# Patient Record
Sex: FEMALE | Race: WHITE | NOT HISPANIC OR LATINO | URBAN - METROPOLITAN AREA
[De-identification: names, ages, dates, MRNs, and addresses within clinical notes are randomized per-mention and may not be internally consistent; named-entity substitution may affect disease eponyms.]

---

## 2017-12-11 ENCOUNTER — INPATIENT (INPATIENT)
Facility: HOSPITAL | Age: 67
LOS: 2 days | Discharge: HOME | End: 2017-12-14
Attending: INTERNAL MEDICINE | Admitting: INTERNAL MEDICINE
Payer: MEDICARE

## 2017-12-11 VITALS
SYSTOLIC BLOOD PRESSURE: 73 MMHG | HEIGHT: 61 IN | RESPIRATION RATE: 16 BRPM | DIASTOLIC BLOOD PRESSURE: 46 MMHG | TEMPERATURE: 98 F | OXYGEN SATURATION: 100 % | HEART RATE: 96 BPM | WEIGHT: 130.07 LBS

## 2017-12-11 DIAGNOSIS — Z98.49 CATARACT EXTRACTION STATUS, UNSPECIFIED EYE: Chronic | ICD-10-CM

## 2017-12-11 LAB
ALBUMIN SERPL ELPH-MCNC: 3.2 G/DL — LOW (ref 3.3–5)
ALP SERPL-CCNC: 94 U/L — SIGNIFICANT CHANGE UP (ref 40–120)
ALT FLD-CCNC: 27 U/L — SIGNIFICANT CHANGE UP (ref 12–78)
ANION GAP SERPL CALC-SCNC: 11 MMOL/L — SIGNIFICANT CHANGE UP (ref 5–17)
APPEARANCE UR: (no result)
APTT BLD: 26.5 SEC — LOW (ref 27.5–37.4)
AST SERPL-CCNC: 18 U/L — SIGNIFICANT CHANGE UP (ref 15–37)
BACTERIA # UR AUTO: (no result)
BASOPHILS # BLD AUTO: 0 K/UL — SIGNIFICANT CHANGE UP (ref 0–0.2)
BASOPHILS NFR BLD AUTO: 0.3 % — SIGNIFICANT CHANGE UP (ref 0–2)
BILIRUB SERPL-MCNC: 0.5 MG/DL — SIGNIFICANT CHANGE UP (ref 0.2–1.2)
BILIRUB UR-MCNC: NEGATIVE — SIGNIFICANT CHANGE UP
BUN SERPL-MCNC: 15 MG/DL — SIGNIFICANT CHANGE UP (ref 7–23)
CALCIUM SERPL-MCNC: 8.7 MG/DL — SIGNIFICANT CHANGE UP (ref 8.5–10.1)
CHLORIDE SERPL-SCNC: 90 MMOL/L — LOW (ref 96–108)
CO2 SERPL-SCNC: 25 MMOL/L — SIGNIFICANT CHANGE UP (ref 22–31)
COLOR SPEC: YELLOW — SIGNIFICANT CHANGE UP
CREAT SERPL-MCNC: 1.57 MG/DL — HIGH (ref 0.5–1.3)
DIFF PNL FLD: (no result)
EOSINOPHIL # BLD AUTO: 0 K/UL — SIGNIFICANT CHANGE UP (ref 0–0.5)
EOSINOPHIL NFR BLD AUTO: 0.3 % — SIGNIFICANT CHANGE UP (ref 0–6)
EPI CELLS # UR: (no result)
FLUBV RNA SPEC QL NAA+PROBE: DETECTED
GLUCOSE SERPL-MCNC: 492 MG/DL — CRITICAL HIGH (ref 70–99)
GLUCOSE UR QL: 1000 MG/DL
HCT VFR BLD CALC: 40.3 % — SIGNIFICANT CHANGE UP (ref 34.5–45)
HGB BLD-MCNC: 13.2 G/DL — SIGNIFICANT CHANGE UP (ref 11.5–15.5)
INR BLD: 1.16 RATIO — SIGNIFICANT CHANGE UP (ref 0.88–1.16)
KETONES UR-MCNC: (no result)
LACTATE SERPL-SCNC: 1.3 MMOL/L — SIGNIFICANT CHANGE UP (ref 0.7–2)
LACTATE SERPL-SCNC: 3.1 MMOL/L — HIGH (ref 0.7–2)
LEUKOCYTE ESTERASE UR-ACNC: (no result)
LYMPHOCYTES # BLD AUTO: 1.1 K/UL — SIGNIFICANT CHANGE UP (ref 1–3.3)
LYMPHOCYTES # BLD AUTO: 9.8 % — LOW (ref 13–44)
MCHC RBC-ENTMCNC: 29.2 PG — SIGNIFICANT CHANGE UP (ref 27–34)
MCHC RBC-ENTMCNC: 32.8 GM/DL — SIGNIFICANT CHANGE UP (ref 32–36)
MCV RBC AUTO: 88.9 FL — SIGNIFICANT CHANGE UP (ref 80–100)
MONOCYTES # BLD AUTO: 0.6 K/UL — SIGNIFICANT CHANGE UP (ref 0–0.9)
MONOCYTES NFR BLD AUTO: 5.4 % — SIGNIFICANT CHANGE UP (ref 2–14)
NEUTROPHILS # BLD AUTO: 9.8 K/UL — HIGH (ref 1.8–7.4)
NEUTROPHILS NFR BLD AUTO: 84.2 % — HIGH (ref 43–77)
NITRITE UR-MCNC: NEGATIVE — SIGNIFICANT CHANGE UP
PH UR: 5 — SIGNIFICANT CHANGE UP (ref 5–8)
PLATELET # BLD AUTO: 198 K/UL — SIGNIFICANT CHANGE UP (ref 150–400)
POTASSIUM SERPL-MCNC: 4.3 MMOL/L — SIGNIFICANT CHANGE UP (ref 3.5–5.3)
POTASSIUM SERPL-SCNC: 4.3 MMOL/L — SIGNIFICANT CHANGE UP (ref 3.5–5.3)
PROT SERPL-MCNC: 7.7 GM/DL — SIGNIFICANT CHANGE UP (ref 6–8.3)
PROT UR-MCNC: 15 MG/DL
PROTHROM AB SERPL-ACNC: 12.6 SEC — SIGNIFICANT CHANGE UP (ref 9.8–12.7)
RAPID RVP RESULT: DETECTED
RBC # BLD: 4.53 M/UL — SIGNIFICANT CHANGE UP (ref 3.8–5.2)
RBC # FLD: 11.6 % — SIGNIFICANT CHANGE UP (ref 10.3–14.5)
RBC CASTS # UR COMP ASSIST: (no result) /HPF (ref 0–4)
SODIUM SERPL-SCNC: 126 MMOL/L — LOW (ref 135–145)
SP GR SPEC: 1 — LOW (ref 1.01–1.02)
UROBILINOGEN FLD QL: NEGATIVE MG/DL — SIGNIFICANT CHANGE UP
WBC # BLD: 11.7 K/UL — HIGH (ref 3.8–10.5)
WBC # FLD AUTO: 11.7 K/UL — HIGH (ref 3.8–10.5)
WBC UR QL: (no result)

## 2017-12-11 PROCEDURE — 93010 ELECTROCARDIOGRAM REPORT: CPT

## 2017-12-11 PROCEDURE — 71010: CPT | Mod: 26

## 2017-12-11 PROCEDURE — 99285 EMERGENCY DEPT VISIT HI MDM: CPT

## 2017-12-11 PROCEDURE — 71260 CT THORAX DX C+: CPT | Mod: 26

## 2017-12-11 RX ORDER — SODIUM CHLORIDE 9 MG/ML
2000 INJECTION INTRAMUSCULAR; INTRAVENOUS; SUBCUTANEOUS ONCE
Qty: 0 | Refills: 0 | Status: COMPLETED | OUTPATIENT
Start: 2017-12-11 | End: 2017-12-11

## 2017-12-11 RX ORDER — CEFTRIAXONE 500 MG/1
1 INJECTION, POWDER, FOR SOLUTION INTRAMUSCULAR; INTRAVENOUS ONCE
Qty: 0 | Refills: 0 | Status: DISCONTINUED | OUTPATIENT
Start: 2017-12-11 | End: 2017-12-11

## 2017-12-11 RX ORDER — AZITHROMYCIN 500 MG/1
500 TABLET, FILM COATED ORAL ONCE
Qty: 0 | Refills: 0 | Status: COMPLETED | OUTPATIENT
Start: 2017-12-11 | End: 2017-12-11

## 2017-12-11 RX ORDER — CEFTRIAXONE 500 MG/1
1 INJECTION, POWDER, FOR SOLUTION INTRAMUSCULAR; INTRAVENOUS EVERY 24 HOURS
Qty: 0 | Refills: 0 | Status: DISCONTINUED | OUTPATIENT
Start: 2017-12-11 | End: 2017-12-11

## 2017-12-11 RX ORDER — SODIUM CHLORIDE 9 MG/ML
1000 INJECTION INTRAMUSCULAR; INTRAVENOUS; SUBCUTANEOUS ONCE
Qty: 0 | Refills: 0 | Status: COMPLETED | OUTPATIENT
Start: 2017-12-11 | End: 2017-12-11

## 2017-12-11 RX ORDER — SODIUM CHLORIDE 9 MG/ML
3 INJECTION INTRAMUSCULAR; INTRAVENOUS; SUBCUTANEOUS ONCE
Qty: 0 | Refills: 0 | Status: COMPLETED | OUTPATIENT
Start: 2017-12-11 | End: 2017-12-11

## 2017-12-11 RX ORDER — CEFTRIAXONE 500 MG/1
1000 INJECTION, POWDER, FOR SOLUTION INTRAMUSCULAR; INTRAVENOUS EVERY 24 HOURS
Qty: 0 | Refills: 0 | Status: DISCONTINUED | OUTPATIENT
Start: 2017-12-11 | End: 2017-12-14

## 2017-12-11 RX ORDER — AZITHROMYCIN 500 MG/1
500 TABLET, FILM COATED ORAL EVERY 24 HOURS
Qty: 0 | Refills: 0 | Status: DISCONTINUED | OUTPATIENT
Start: 2017-12-11 | End: 2017-12-14

## 2017-12-11 RX ORDER — CEFTRIAXONE 500 MG/1
1000 INJECTION, POWDER, FOR SOLUTION INTRAMUSCULAR; INTRAVENOUS ONCE
Qty: 0 | Refills: 0 | Status: COMPLETED | OUTPATIENT
Start: 2017-12-11 | End: 2017-12-11

## 2017-12-11 RX ADMIN — Medication 75 MILLIGRAM(S): at 22:34

## 2017-12-11 RX ADMIN — AZITHROMYCIN 255 MILLIGRAM(S): 500 TABLET, FILM COATED ORAL at 21:02

## 2017-12-11 RX ADMIN — SODIUM CHLORIDE 1000 MILLILITER(S): 9 INJECTION INTRAMUSCULAR; INTRAVENOUS; SUBCUTANEOUS at 17:24

## 2017-12-11 RX ADMIN — SODIUM CHLORIDE 3 MILLILITER(S): 9 INJECTION INTRAMUSCULAR; INTRAVENOUS; SUBCUTANEOUS at 16:15

## 2017-12-11 RX ADMIN — SODIUM CHLORIDE 1000 MILLILITER(S): 9 INJECTION INTRAMUSCULAR; INTRAVENOUS; SUBCUTANEOUS at 19:55

## 2017-12-11 RX ADMIN — CEFTRIAXONE 1000 MILLIGRAM(S): 500 INJECTION, POWDER, FOR SOLUTION INTRAMUSCULAR; INTRAVENOUS at 21:02

## 2017-12-11 RX ADMIN — SODIUM CHLORIDE 2000 MILLILITER(S): 9 INJECTION INTRAMUSCULAR; INTRAVENOUS; SUBCUTANEOUS at 16:15

## 2017-12-11 NOTE — ED PROVIDER NOTE - OBJECTIVE STATEMENT
68 y/o female with a PMHx of HTN, HLD, DM presents to the ED c/o hypotension for over a week. Pt c/o cough, sore throat, body aches, diarrhea and vomit x4 days. Denies difficulty urinating, fever. Non smoker. Daughter has been diagnosed with the flu.

## 2017-12-11 NOTE — H&P ADULT - NSHPPHYSICALEXAM_GEN_ALL_CORE
ICU Vital Signs Last 24 Hrs    T(F): 98.2 (11 Dec 2017 21:50), Max: 98.2 (11 Dec 2017 21:50)    HR: 98 (11 Dec 2017 21:50) (79 - 98)    BP: 104/61 (11 Dec 2017 21:50) (73/46 - 104/61)    RR: 20    SpO2: 98%

## 2017-12-11 NOTE — H&P ADULT - ASSESSMENT
Pt is admitted w/    I. Pneumonia, with Septic Shock,  Hypotension.  RVP positive Influ .  Lactate 3.1, improved to 1.3.  - IVF   - Rocephin , Zithromax  - Tamiflu  - blood cultures, sputum cx    II. Acute Renal Injury,  likely prerenal   - IVF   - follow labs    III. DM II  - Insulin sliding carmen    IV. DVT prophylaxis  - heparin Pt is a  66 y/o woman  with a PMHx of HTN, HLD, DM II who presents to the ER c/o weakness,  cough and chills.  She was seen at her PMD's office on Dec 5th and treated for viral gastroenteritis, at the time her rapid flu test was negative/indeterminate.  She has worsened and reports increased lethargy and not being able to eat for 5 days.  She said her daughter and other family members at home have been having the flu.        She denies chest pain, abd pain, fever.  An outpatient eval showed hypotension and  xray showed ovoid opacities bilaterally concerning for Pna.     CT shows:  Small patchy airspace consolidation in the right upper lobe, left upper   lobe, and lingula indicative of pneumonia.       Pt is admitted w/    I. Pneumonia, with Septic Shock,  Hypotension.  RVP positive : Influ B .  Lactate 3.1, improved to 1.3.  - IVF , 3 Liters given  - Rocephin , Zithromax  - Tamiflu  - blood cultures, sputum cx  - ID consult    II. Hyponatremia and Acute Renal Injury,  likely prerenal  due to dehydration, pt denies renal issues in the past  - IVF   - hold lisinpril  and oral DM agents tonight  - follow labs    III. DM II  - Insulin sliding carmen    IV. Follow-up noncontrast chest CT   - in one month to ensure complete resolution of lung findings       V. DVT prophylaxis  - heparin

## 2017-12-11 NOTE — H&P ADULT - NSHPLABSRESULTS_GEN_ALL_CORE
NSR 96 bpm, LAD        < from: CT Chest w/ IV Cont (12.11.17 @ 18:03) >    EXAM:  CT CHEST IC                          PROCEDURE DATE:  12/11/2017      INTERPRETATION:  CT CHEST IC    HISTORY:  cough 1 week hypotension  r/o pna                     TECHNIQUE: Contrast enhanced CT of the chest was performed with 90 cc  Omnipaque 350 IV contrast. Coronal and sagittal images were   reconstructed. This study was performed using automatic exposure control   (radiation dose reduction software) to obtain a diagnostic image quality   scan with patient dose as low as reasonably achievable.    COMPARISON: Chest x-ray from the same day    FINDINGS:    LUNGS, AIRWAYS: No central endobronchial lesion or obstruction. Small   patchy airspace consolidation in the anterior right upper lobe, left   upper lobe, and lingula.    PLEURA: No pleural abnormality.    HEART AND VESSELS: Normal heart size. No pericardial effusion. Normal   caliber thoracic aorta. A ductus bump is noted. Main pulmonary arteries   are patent.    MEDIASTINUM AND GURWINDER: No adenopathy.    UPPER ABDOMEN:Diffuse hepatic steatosis.    BONES AND SOFT TISSUES: No acute bony abnormality.    IMPRESSION:     Small patchy airspace consolidation in the right upper lobe, left upper   lobe, and lingula indicative of pneumonia. Follow-up noncontrast chest CT   in one month to ensure complete resolution.    VERONICA GARCIA   This document has been electronically signed. Dec 11 2017  6:40PM    < end of copied text >

## 2017-12-11 NOTE — H&P ADULT - HISTORY OF PRESENT ILLNESS
Pt is a  68 y/o  with a PMHx of HTN, HLD, DM II presents to the ED c/o hypotension for over a week.   Pt c/o cough, sore throat, body aches, diarrhea and vomit x4 days. Denies difficulty urinating, fever. Non smoker. Daughter has been diagnosed with the flu. Pt is a  66 y/o woman  with a PMHx of HTN, HLD, DM II who presents to the ER c/o weakness,  cough and chills.  She was seen at her PMD's office on Dec 5th and treated for viral gastroenteritis, at the time her rapid flu test was negative/indeterminate.  She has worsened and reports increased lethargy and not being able to eat for 5 days.  She said her daughter and other family members at home have been having the flu.        She denies chest pain, abd pain, fever.  An outpatient xray showed ovoid opacities bilaterally concerning for Pna.     RVP is positive in the ER and pt is hypotensive

## 2017-12-11 NOTE — ED PROVIDER NOTE - NS_ ATTENDINGSCRIBEDETAILS _ED_A_ED_FT
I, Cesar Oliver MD,  performed the initial face to face bedside interview with this patient regarding history of present illness, review of symptoms and relevant past medical, social and family history.  I completed an independent physical examination.    The history, relevant review of systems, past medical and surgical history, medical decision making, and physical examination was documented by the scribe in my presence and I attest to the accuracy of the documentation.

## 2017-12-11 NOTE — ED ADULT NURSE NOTE - OBJECTIVE STATEMENT
patient sent by pcp for hypotensive episode, patient 106/83 on arrival into trauma room. patient is awake, alert and oriented states she had nausea that resolved 5 days ago, had diarrhea 2 days ago, but took immodium with no event since.  patient states she has been fatigued with myalgias, states her daughter in law is recovering from the flu.

## 2017-12-11 NOTE — ED PROVIDER NOTE - PROGRESS NOTE DETAILS
Becki SP: Dr. Oliver spoke with radiology and decided benefits of IV contrast to r/o emboli out way the risks. NIELS Lynne have attested this document for and under the supervision of Dr. Oliver

## 2017-12-11 NOTE — ED ADULT TRIAGE NOTE - CHIEF COMPLAINT QUOTE
Patient comes to ED for cough for 2 weeks. Pt had chest xray done showed opacities and possible metatisis. pt also hypotensive

## 2017-12-12 LAB
ANION GAP SERPL CALC-SCNC: 6 MMOL/L — SIGNIFICANT CHANGE UP (ref 5–17)
BASOPHILS # BLD AUTO: 0 K/UL — SIGNIFICANT CHANGE UP (ref 0–0.2)
BASOPHILS NFR BLD AUTO: 0.4 % — SIGNIFICANT CHANGE UP (ref 0–2)
BUN SERPL-MCNC: 11 MG/DL — SIGNIFICANT CHANGE UP (ref 7–23)
CALCIUM SERPL-MCNC: 7.6 MG/DL — LOW (ref 8.5–10.1)
CHLORIDE SERPL-SCNC: 105 MMOL/L — SIGNIFICANT CHANGE UP (ref 96–108)
CO2 SERPL-SCNC: 24 MMOL/L — SIGNIFICANT CHANGE UP (ref 22–31)
CREAT SERPL-MCNC: 0.84 MG/DL — SIGNIFICANT CHANGE UP (ref 0.5–1.3)
EOSINOPHIL # BLD AUTO: 0.1 K/UL — SIGNIFICANT CHANGE UP (ref 0–0.5)
EOSINOPHIL NFR BLD AUTO: 1.1 % — SIGNIFICANT CHANGE UP (ref 0–6)
GLUCOSE BLDC GLUCOMTR-MCNC: 256 MG/DL — HIGH (ref 70–99)
GLUCOSE BLDC GLUCOMTR-MCNC: 278 MG/DL — HIGH (ref 70–99)
GLUCOSE BLDC GLUCOMTR-MCNC: 343 MG/DL — HIGH (ref 70–99)
GLUCOSE BLDC GLUCOMTR-MCNC: 346 MG/DL — HIGH (ref 70–99)
GLUCOSE SERPL-MCNC: 334 MG/DL — HIGH (ref 70–99)
HCT VFR BLD CALC: 31 % — LOW (ref 34.5–45)
HGB BLD-MCNC: 10.5 G/DL — LOW (ref 11.5–15.5)
LYMPHOCYTES # BLD AUTO: 1.2 K/UL — SIGNIFICANT CHANGE UP (ref 1–3.3)
LYMPHOCYTES # BLD AUTO: 14.9 % — SIGNIFICANT CHANGE UP (ref 13–44)
MCHC RBC-ENTMCNC: 29.8 PG — SIGNIFICANT CHANGE UP (ref 27–34)
MCHC RBC-ENTMCNC: 33.8 GM/DL — SIGNIFICANT CHANGE UP (ref 32–36)
MCV RBC AUTO: 88 FL — SIGNIFICANT CHANGE UP (ref 80–100)
MONOCYTES # BLD AUTO: 0.6 K/UL — SIGNIFICANT CHANGE UP (ref 0–0.9)
MONOCYTES NFR BLD AUTO: 8 % — SIGNIFICANT CHANGE UP (ref 2–14)
NEUTROPHILS # BLD AUTO: 6.1 K/UL — SIGNIFICANT CHANGE UP (ref 1.8–7.4)
NEUTROPHILS NFR BLD AUTO: 75.7 % — SIGNIFICANT CHANGE UP (ref 43–77)
PLATELET # BLD AUTO: 154 K/UL — SIGNIFICANT CHANGE UP (ref 150–400)
POTASSIUM SERPL-MCNC: 3.7 MMOL/L — SIGNIFICANT CHANGE UP (ref 3.5–5.3)
POTASSIUM SERPL-SCNC: 3.7 MMOL/L — SIGNIFICANT CHANGE UP (ref 3.5–5.3)
RBC # BLD: 3.52 M/UL — LOW (ref 3.8–5.2)
RBC # FLD: 11.6 % — SIGNIFICANT CHANGE UP (ref 10.3–14.5)
SODIUM SERPL-SCNC: 135 MMOL/L — SIGNIFICANT CHANGE UP (ref 135–145)
WBC # BLD: 8 K/UL — SIGNIFICANT CHANGE UP (ref 3.8–10.5)
WBC # FLD AUTO: 8 K/UL — SIGNIFICANT CHANGE UP (ref 3.8–10.5)

## 2017-12-12 RX ORDER — HEPARIN SODIUM 5000 [USP'U]/ML
5000 INJECTION INTRAVENOUS; SUBCUTANEOUS EVERY 12 HOURS
Qty: 0 | Refills: 0 | Status: DISCONTINUED | OUTPATIENT
Start: 2017-12-12 | End: 2017-12-14

## 2017-12-12 RX ORDER — ALBUTEROL 90 UG/1
2.5 AEROSOL, METERED ORAL THREE TIMES A DAY
Qty: 0 | Refills: 0 | Status: DISCONTINUED | OUTPATIENT
Start: 2017-12-12 | End: 2017-12-12

## 2017-12-12 RX ORDER — INSULIN LISPRO 100/ML
VIAL (ML) SUBCUTANEOUS AT BEDTIME
Qty: 0 | Refills: 0 | Status: DISCONTINUED | OUTPATIENT
Start: 2017-12-12 | End: 2017-12-14

## 2017-12-12 RX ORDER — LISINOPRIL 2.5 MG/1
10 TABLET ORAL DAILY
Qty: 0 | Refills: 0 | Status: DISCONTINUED | OUTPATIENT
Start: 2017-12-12 | End: 2017-12-12

## 2017-12-12 RX ORDER — DAPAGLIFLOZIN 10 MG/1
10 TABLET, FILM COATED ORAL
Qty: 0 | Refills: 0 | COMMUNITY

## 2017-12-12 RX ORDER — SIMVASTATIN 20 MG/1
20 TABLET, FILM COATED ORAL
Qty: 0 | Refills: 0 | COMMUNITY

## 2017-12-12 RX ORDER — ALBUTEROL 90 UG/1
2.5 AEROSOL, METERED ORAL THREE TIMES A DAY
Qty: 0 | Refills: 0 | Status: DISCONTINUED | OUTPATIENT
Start: 2017-12-12 | End: 2017-12-14

## 2017-12-12 RX ORDER — ACETAMINOPHEN 500 MG
500 TABLET ORAL EVERY 6 HOURS
Qty: 0 | Refills: 0 | Status: DISCONTINUED | OUTPATIENT
Start: 2017-12-12 | End: 2017-12-14

## 2017-12-12 RX ORDER — INSULIN LISPRO 100/ML
VIAL (ML) SUBCUTANEOUS
Qty: 0 | Refills: 0 | Status: DISCONTINUED | OUTPATIENT
Start: 2017-12-12 | End: 2017-12-14

## 2017-12-12 RX ORDER — SODIUM CHLORIDE 9 MG/ML
500 INJECTION INTRAMUSCULAR; INTRAVENOUS; SUBCUTANEOUS
Qty: 0 | Refills: 0 | Status: DISCONTINUED | OUTPATIENT
Start: 2017-12-12 | End: 2017-12-13

## 2017-12-12 RX ORDER — BRIMONIDINE TARTRATE, TIMOLOL MALEATE 2; 5 MG/ML; MG/ML
1 SOLUTION/ DROPS OPHTHALMIC
Qty: 0 | Refills: 0 | COMMUNITY

## 2017-12-12 RX ORDER — SIMVASTATIN 20 MG/1
20 TABLET, FILM COATED ORAL AT BEDTIME
Qty: 0 | Refills: 0 | Status: DISCONTINUED | OUTPATIENT
Start: 2017-12-12 | End: 2017-12-14

## 2017-12-12 RX ORDER — METFORMIN HYDROCHLORIDE 850 MG/1
1000 TABLET ORAL
Qty: 0 | Refills: 0 | COMMUNITY

## 2017-12-12 RX ORDER — LISINOPRIL 2.5 MG/1
1 TABLET ORAL
Qty: 0 | Refills: 0 | COMMUNITY

## 2017-12-12 RX ADMIN — SIMVASTATIN 20 MILLIGRAM(S): 20 TABLET, FILM COATED ORAL at 22:42

## 2017-12-12 RX ADMIN — HEPARIN SODIUM 5000 UNIT(S): 5000 INJECTION INTRAVENOUS; SUBCUTANEOUS at 06:42

## 2017-12-12 RX ADMIN — Medication 75 MILLIGRAM(S): at 17:56

## 2017-12-12 RX ADMIN — Medication 100 MILLIGRAM(S): at 22:42

## 2017-12-12 RX ADMIN — Medication 4: at 13:01

## 2017-12-12 RX ADMIN — CEFTRIAXONE 1000 MILLIGRAM(S): 500 INJECTION, POWDER, FOR SOLUTION INTRAMUSCULAR; INTRAVENOUS at 14:40

## 2017-12-12 RX ADMIN — AZITHROMYCIN 255 MILLIGRAM(S): 500 TABLET, FILM COATED ORAL at 14:38

## 2017-12-12 RX ADMIN — SODIUM CHLORIDE 65 MILLILITER(S): 9 INJECTION INTRAMUSCULAR; INTRAVENOUS; SUBCUTANEOUS at 06:41

## 2017-12-12 RX ADMIN — Medication 4: at 17:54

## 2017-12-12 RX ADMIN — HEPARIN SODIUM 5000 UNIT(S): 5000 INJECTION INTRAVENOUS; SUBCUTANEOUS at 17:55

## 2017-12-12 RX ADMIN — Medication 2: at 22:42

## 2017-12-12 RX ADMIN — Medication 3: at 09:13

## 2017-12-12 RX ADMIN — Medication 100 MILLIGRAM(S): at 00:58

## 2017-12-12 RX ADMIN — Medication 100 MILLIGRAM(S): at 16:02

## 2017-12-12 NOTE — ED ADULT NURSE REASSESSMENT NOTE - NS ED NURSE REASSESS COMMENT FT1
Patient A&Ox4, resting comfortably in bed. VSS, denies pain/discomfort. Denies SOB, productive cough with green sputum per patient. No s/s of respiratory distress present, scattered rhonchi to auscultation. Safety & comfort measures in place, will continue to monitor.
care of pt received from Africa Harrison RN, pt and family updated by RN and MD Oliver in regards to radiology results and plan of care at this time. pt and family verbalized understanding of information and need for admission. Bed status explained to pt and family. Pt and family verbalized understanding of information. Pt has no complaints of chest pain, nausea, dizziness or headache. Pt remains on isolation precautions. Care of pt transferred to Aquilino Barbosa RN.
patient had hypotensive episode, states she felt dizzy. IVF infusing. dr tate in to evaluate.  will monitor
patient transported to CT scan, stable at this time. awake, alert and oriented
Patient received from AMANDA Allen. Patient resting comfortably in bed. Safety and comfort maintained. Will continue to monitor.

## 2017-12-12 NOTE — CONSULT NOTE ADULT - ASSESSMENT
Pt is a  66 y/o woman  with a PMHx of HTN, HLD, DM II admitted 12/11 with c/o weakness, cough and chills.  She was seen at her PMD's office on Dec 5th and treated for viral gastroenteritis, at the time her rapid flu test was negative/indeterminate. She has worsened and reports increased lethargy and not being able to eat for 5 days.  She said her daughter and other family members at home have been having the flu. She denies chest pain, abd pain, fever.  An outpatient xray showed ovoid opacities bilaterally concerning for Pna. RVP is positive in the ER for influenza B, CT chest with multifocal patchy consolidations, pt noted to have wbc ct 11.7, noted to have low bp, was given tamiflu, rocephin/azithromycin.    1. sepsis/influenza B/viral syndrome/CAP  - on tamiflu increase dose to 75mg BID, renal function normalized  - on droplet precautions  - continue with rocephin 1gm daily + azithromycin for 500mg daily for superimposed cap coverage  - plan for 3 days of azithromycin  - diarrhea resolved likely viral in etiology  - if diarrhea returns, can send GI PCR  - f/u cbc  - monitor temps  -tolerating abx well so far; no side effects noted  -reason for abx use and side effects reviewed with patient    2. other issues -care per medicine

## 2017-12-12 NOTE — ED ADULT NURSE REASSESSMENT NOTE - COMFORT CARE
assisted to bedpan
plan of care explained
assisted to bedpan
plan of care explained/darkened lights/wait time explained/side rails up

## 2017-12-12 NOTE — CONSULT NOTE ADULT - SUBJECTIVE AND OBJECTIVE BOX
Patient is a 67y old  Female who presents with a chief complaint of cough (11 Dec 2017 22:54)      HPI:  Pt is a  68 y/o woman  with a PMHx of HTN, HLD, DM II admitted  with c/o weakness, cough and chills.  She was seen at her PMD's office on Dec 5th and treated for viral gastroenteritis, at the time her rapid flu test was negative/indeterminate. She has worsened and reports increased lethargy and not being able to eat for 5 days.  She said her daughter and other family members at home have been having the flu. She denies chest pain, abd pain, fever.  An outpatient xray showed ovoid opacities bilaterally concerning for Pna. RVP is positive in the ER for influenza B, CT chest with multifocal patchy consolidations, pt noted to have wbc ct 11.7, noted to have low bp, was given tamiflu, rocephin/azithromycin.      PMH: as above    PSH: as above    Meds: per reconciliation sheet, noted below    MEDICATIONS  (STANDING):  azithromycin  IVPB 500 milliGRAM(s) IV Intermittent every 24 hours  cefTRIAXone Injectable 1000 milliGRAM(s) IV Push every 24 hours  heparin  Injectable 5000 Unit(s) SubCutaneous every 12 hours  insulin lispro (HumaLOG) corrective regimen sliding scale   SubCutaneous three times a day before meals  insulin lispro (HumaLOG) corrective regimen sliding scale   SubCutaneous at bedtime  oseltamivir 75 milliGRAM(s) Oral daily  simvastatin 20 milliGRAM(s) Oral at bedtime  sodium chloride 0.9%. 500 milliLiter(s) (65 mL/Hr) IV Continuous <Continuous>      Allergies    No Known Allergies    Intolerances        Social: no smoking, no alcohol, no illegal drugs; no recent travel, no exposure to TB    Family history:  No pertinent family history in first degree relatives      ROS: no HA, no dizziness, no sore throat, no blurry vision, no CP, no palpitations, no abdominal pain, no diarrhea, no N/V, no dysuria, no leg pain, no claudication, no rash, no joint aches, no rectal pain or bleeding, no night sweats    Vital Signs Last 24 Hrs  T(C): 37.3 (12 Dec 2017 09:18), Max: 37.4 (12 Dec 2017 06:40)  T(F): 99.1 (12 Dec 2017 09:18), Max: 99.4 (12 Dec 2017 06:40)  HR: 100 (12 Dec 2017 09:18) (79 - 102)  BP: 107/57 (12 Dec 2017 09:18) (73/46 - 114/74)  BP(mean): --  RR: 20 (12 Dec 2017 09:18) (16 - 20)  SpO2: 98% (12 Dec 2017 09:18) (96% - 100%)      PE:  Constitutional: frail looking  HEENT: NC/AT, EOMI, PERRLA  Neck: supple  Back: no tenderness  Respiratory: decreased breath sounds  Cardiovascular: S1S2 regular, no murmurs  Abdomen: soft, not tender, not distended, positive BS  Genitourinary: deferred  Rectal: deferred  Musculoskeletal: no muscle tenderness, no joint swelling or tenderness  Extremities: no pedal edema  Neurological: no focal deficits  Skin: no rashes    Labs:                        10.5   8.0   )-----------( 154      ( 12 Dec 2017 05:10 )             31.0         135  |  105  |  11  ----------------------------<  334<H>  3.7   |  24  |  0.84    Ca    7.6<L>      12 Dec 2017 05:10    TPro  7.7  /  Alb  3.2<L>  /  TBili  0.5  /  DBili  x   /  AST  18  /  ALT  27  /  AlkPhos  94  12-11     LIVER FUNCTIONS - ( 11 Dec 2017 15:46 )  Alb: 3.2 g/dL / Pro: 7.7 gm/dL / ALK PHOS: 94 U/L / ALT: 27 U/L / AST: 18 U/L / GGT: x           Urinalysis Basic - ( 11 Dec 2017 19:01 )    Color: Yellow / Appearance: Slightly Turbid / S.005 / pH: x  Gluc: x / Ketone: Trace  / Bili: Negative / Urobili: Negative mg/dL   Blood: x / Protein: 15 mg/dL / Nitrite: Negative   Leuk Esterase: Moderate / RBC: 3-5 /HPF / WBC 26-50   Sq Epi: x / Non Sq Epi: Moderate / Bacteria: Moderate            Radiology:  < from: CT Chest w/ IV Cont (17 @ 18:03) >    EXAM:  CT CHEST IC                            PROCEDURE DATE:  2017          INTERPRETATION:  CT CHEST IC    HISTORY:  cough 1 week hypotension  r/o pna                     TECHNIQUE: Contrast enhanced CT of the chest was performed with 90 cc  Omnipaque 350 IV contrast. Coronal and sagittal images were   reconstructed. This study was performed using automatic exposure control   (radiation dose reduction software) to obtain a diagnostic image quality   scan with patient dose as low as reasonably achievable.    COMPARISON: Chest x-ray from the same day    FINDINGS:    LUNGS, AIRWAYS: No central endobronchial lesion or obstruction. Small   patchy airspace consolidation in the anterior right upper lobe, left   upper lobe, and lingula.    PLEURA: No pleural abnormality.    HEART AND VESSELS: Normal heart size. No pericardial effusion. Normal   caliber thoracic aorta. A ductus bump is noted. Main pulmonary arteries   are patent.    MEDIASTINUM AND GURWINDER: No adenopathy.    UPPER ABDOMEN:Diffuse hepatic steatosis.    BONES AND SOFT TISSUES: No acute bony abnormality.    IMPRESSION:     Small patchy airspace consolidation in the right upper lobe, left upper   lobe, and lingula indicative of pneumonia. Follow-up noncontrast chest CT   in one month to ensure complete resolution.    < end of copied text >    Advanced directives addressed: full resuscitation

## 2017-12-13 LAB
-  AMIKACIN: SIGNIFICANT CHANGE UP
-  AMPICILLIN/SULBACTAM: SIGNIFICANT CHANGE UP
-  AMPICILLIN: SIGNIFICANT CHANGE UP
-  AZTREONAM: SIGNIFICANT CHANGE UP
-  CEFAZOLIN: SIGNIFICANT CHANGE UP
-  CEFEPIME: SIGNIFICANT CHANGE UP
-  CEFOXITIN: SIGNIFICANT CHANGE UP
-  CEFTAZIDIME: SIGNIFICANT CHANGE UP
-  CEFTRIAXONE: SIGNIFICANT CHANGE UP
-  CIPROFLOXACIN: SIGNIFICANT CHANGE UP
-  ERTAPENEM: SIGNIFICANT CHANGE UP
-  GENTAMICIN: SIGNIFICANT CHANGE UP
-  IMIPENEM: SIGNIFICANT CHANGE UP
-  LEVOFLOXACIN: SIGNIFICANT CHANGE UP
-  MEROPENEM: SIGNIFICANT CHANGE UP
-  NITROFURANTOIN: SIGNIFICANT CHANGE UP
-  PIPERACILLIN/TAZOBACTAM: SIGNIFICANT CHANGE UP
-  TOBRAMYCIN: SIGNIFICANT CHANGE UP
-  TRIMETHOPRIM/SULFAMETHOXAZOLE: SIGNIFICANT CHANGE UP
ANION GAP SERPL CALC-SCNC: 7 MMOL/L — SIGNIFICANT CHANGE UP (ref 5–17)
BUN SERPL-MCNC: 6 MG/DL — LOW (ref 7–23)
CALCIUM SERPL-MCNC: 7.9 MG/DL — LOW (ref 8.5–10.1)
CHLORIDE SERPL-SCNC: 106 MMOL/L — SIGNIFICANT CHANGE UP (ref 96–108)
CO2 SERPL-SCNC: 25 MMOL/L — SIGNIFICANT CHANGE UP (ref 22–31)
CREAT SERPL-MCNC: 0.55 MG/DL — SIGNIFICANT CHANGE UP (ref 0.5–1.3)
CULTURE RESULTS: SIGNIFICANT CHANGE UP
GLUCOSE BLDC GLUCOMTR-MCNC: 230 MG/DL — HIGH (ref 70–99)
GLUCOSE BLDC GLUCOMTR-MCNC: 297 MG/DL — HIGH (ref 70–99)
GLUCOSE BLDC GLUCOMTR-MCNC: 315 MG/DL — HIGH (ref 70–99)
GLUCOSE BLDC GLUCOMTR-MCNC: 331 MG/DL — HIGH (ref 70–99)
GLUCOSE SERPL-MCNC: 226 MG/DL — HIGH (ref 70–99)
HBA1C BLD-MCNC: 10.5 % — HIGH (ref 4–5.6)
HCT VFR BLD CALC: 29.4 % — LOW (ref 34.5–45)
HGB BLD-MCNC: 10 G/DL — LOW (ref 11.5–15.5)
LEGIONELLA AG UR QL: NEGATIVE — SIGNIFICANT CHANGE UP
M PNEUMO IGM SER-ACNC: <20 UNITS/ML — SIGNIFICANT CHANGE UP
MCHC RBC-ENTMCNC: 29.8 PG — SIGNIFICANT CHANGE UP (ref 27–34)
MCHC RBC-ENTMCNC: 34 GM/DL — SIGNIFICANT CHANGE UP (ref 32–36)
MCV RBC AUTO: 87.8 FL — SIGNIFICANT CHANGE UP (ref 80–100)
METHOD TYPE: SIGNIFICANT CHANGE UP
MYCOPLASMA AG SPEC QL: NEGATIVE — SIGNIFICANT CHANGE UP
ORGANISM # SPEC MICROSCOPIC CNT: SIGNIFICANT CHANGE UP
ORGANISM # SPEC MICROSCOPIC CNT: SIGNIFICANT CHANGE UP
PLATELET # BLD AUTO: 187 K/UL — SIGNIFICANT CHANGE UP (ref 150–400)
POTASSIUM SERPL-MCNC: 3.3 MMOL/L — LOW (ref 3.5–5.3)
POTASSIUM SERPL-SCNC: 3.3 MMOL/L — LOW (ref 3.5–5.3)
RBC # BLD: 3.35 M/UL — LOW (ref 3.8–5.2)
RBC # FLD: 11.2 % — SIGNIFICANT CHANGE UP (ref 10.3–14.5)
SODIUM SERPL-SCNC: 138 MMOL/L — SIGNIFICANT CHANGE UP (ref 135–145)
SPECIMEN SOURCE: SIGNIFICANT CHANGE UP
WBC # BLD: 5.9 K/UL — SIGNIFICANT CHANGE UP (ref 3.8–10.5)
WBC # FLD AUTO: 5.9 K/UL — SIGNIFICANT CHANGE UP (ref 3.8–10.5)

## 2017-12-13 RX ORDER — POTASSIUM CHLORIDE 20 MEQ
40 PACKET (EA) ORAL ONCE
Qty: 0 | Refills: 0 | Status: COMPLETED | OUTPATIENT
Start: 2017-12-13 | End: 2017-12-13

## 2017-12-13 RX ORDER — METFORMIN HYDROCHLORIDE 850 MG/1
1000 TABLET ORAL
Qty: 0 | Refills: 0 | Status: DISCONTINUED | OUTPATIENT
Start: 2017-12-13 | End: 2017-12-14

## 2017-12-13 RX ORDER — INSULIN GLARGINE 100 [IU]/ML
6 INJECTION, SOLUTION SUBCUTANEOUS EVERY MORNING
Qty: 0 | Refills: 0 | Status: DISCONTINUED | OUTPATIENT
Start: 2017-12-13 | End: 2017-12-14

## 2017-12-13 RX ADMIN — Medication 100 MILLIGRAM(S): at 05:35

## 2017-12-13 RX ADMIN — Medication 500 MILLIGRAM(S): at 07:53

## 2017-12-13 RX ADMIN — Medication 40 MILLIEQUIVALENT(S): at 12:46

## 2017-12-13 RX ADMIN — Medication 1: at 23:16

## 2017-12-13 RX ADMIN — HEPARIN SODIUM 5000 UNIT(S): 5000 INJECTION INTRAVENOUS; SUBCUTANEOUS at 05:35

## 2017-12-13 RX ADMIN — METFORMIN HYDROCHLORIDE 1000 MILLIGRAM(S): 850 TABLET ORAL at 18:07

## 2017-12-13 RX ADMIN — CEFTRIAXONE 1000 MILLIGRAM(S): 500 INJECTION, POWDER, FOR SOLUTION INTRAMUSCULAR; INTRAVENOUS at 15:52

## 2017-12-13 RX ADMIN — SODIUM CHLORIDE 65 MILLILITER(S): 9 INJECTION INTRAMUSCULAR; INTRAVENOUS; SUBCUTANEOUS at 00:41

## 2017-12-13 RX ADMIN — SIMVASTATIN 20 MILLIGRAM(S): 20 TABLET, FILM COATED ORAL at 23:02

## 2017-12-13 RX ADMIN — Medication 4: at 12:47

## 2017-12-13 RX ADMIN — Medication 75 MILLIGRAM(S): at 05:35

## 2017-12-13 RX ADMIN — INSULIN GLARGINE 6 UNIT(S): 100 INJECTION, SOLUTION SUBCUTANEOUS at 10:23

## 2017-12-13 RX ADMIN — AZITHROMYCIN 255 MILLIGRAM(S): 500 TABLET, FILM COATED ORAL at 15:53

## 2017-12-13 RX ADMIN — Medication 2: at 08:46

## 2017-12-13 RX ADMIN — HEPARIN SODIUM 5000 UNIT(S): 5000 INJECTION INTRAVENOUS; SUBCUTANEOUS at 18:07

## 2017-12-13 RX ADMIN — Medication 100 MILLIGRAM(S): at 12:46

## 2017-12-13 RX ADMIN — Medication 75 MILLIGRAM(S): at 18:07

## 2017-12-13 RX ADMIN — Medication 4: at 18:07

## 2017-12-14 VITALS
DIASTOLIC BLOOD PRESSURE: 82 MMHG | TEMPERATURE: 99 F | RESPIRATION RATE: 16 BRPM | HEART RATE: 100 BPM | OXYGEN SATURATION: 98 % | SYSTOLIC BLOOD PRESSURE: 135 MMHG

## 2017-12-14 LAB
ANION GAP SERPL CALC-SCNC: 7 MMOL/L — SIGNIFICANT CHANGE UP (ref 5–17)
BUN SERPL-MCNC: 6 MG/DL — LOW (ref 7–23)
CALCIUM SERPL-MCNC: 8.4 MG/DL — LOW (ref 8.5–10.1)
CHLORIDE SERPL-SCNC: 102 MMOL/L — SIGNIFICANT CHANGE UP (ref 96–108)
CO2 SERPL-SCNC: 27 MMOL/L — SIGNIFICANT CHANGE UP (ref 22–31)
CREAT SERPL-MCNC: 0.63 MG/DL — SIGNIFICANT CHANGE UP (ref 0.5–1.3)
GLUCOSE BLDC GLUCOMTR-MCNC: 215 MG/DL — HIGH (ref 70–99)
GLUCOSE BLDC GLUCOMTR-MCNC: 227 MG/DL — HIGH (ref 70–99)
GLUCOSE SERPL-MCNC: 230 MG/DL — HIGH (ref 70–99)
HCT VFR BLD CALC: 30.3 % — LOW (ref 34.5–45)
HGB BLD-MCNC: 10.2 G/DL — LOW (ref 11.5–15.5)
MCHC RBC-ENTMCNC: 29.7 PG — SIGNIFICANT CHANGE UP (ref 27–34)
MCHC RBC-ENTMCNC: 33.7 GM/DL — SIGNIFICANT CHANGE UP (ref 32–36)
MCV RBC AUTO: 88 FL — SIGNIFICANT CHANGE UP (ref 80–100)
PLATELET # BLD AUTO: 223 K/UL — SIGNIFICANT CHANGE UP (ref 150–400)
POTASSIUM SERPL-MCNC: 3.7 MMOL/L — SIGNIFICANT CHANGE UP (ref 3.5–5.3)
POTASSIUM SERPL-SCNC: 3.7 MMOL/L — SIGNIFICANT CHANGE UP (ref 3.5–5.3)
RBC # BLD: 3.44 M/UL — LOW (ref 3.8–5.2)
RBC # FLD: 11.3 % — SIGNIFICANT CHANGE UP (ref 10.3–14.5)
SODIUM SERPL-SCNC: 136 MMOL/L — SIGNIFICANT CHANGE UP (ref 135–145)
WBC # BLD: 6.3 K/UL — SIGNIFICANT CHANGE UP (ref 3.8–10.5)
WBC # FLD AUTO: 6.3 K/UL — SIGNIFICANT CHANGE UP (ref 3.8–10.5)

## 2017-12-14 RX ORDER — ENOXAPARIN SODIUM 100 MG/ML
12 INJECTION SUBCUTANEOUS
Qty: 3 | Refills: 0 | OUTPATIENT
Start: 2017-12-14 | End: 2018-01-12

## 2017-12-14 RX ORDER — ACETAMINOPHEN 500 MG
1 TABLET ORAL
Qty: 0 | Refills: 0 | COMMUNITY

## 2017-12-14 RX ORDER — CEFUROXIME AXETIL 250 MG
1 TABLET ORAL
Qty: 10 | Refills: 0 | OUTPATIENT
Start: 2017-12-14 | End: 2017-12-18

## 2017-12-14 RX ADMIN — Medication 100 MILLIGRAM(S): at 06:31

## 2017-12-14 RX ADMIN — Medication 75 MILLIGRAM(S): at 06:31

## 2017-12-14 RX ADMIN — CEFTRIAXONE 1000 MILLIGRAM(S): 500 INJECTION, POWDER, FOR SOLUTION INTRAMUSCULAR; INTRAVENOUS at 15:19

## 2017-12-14 RX ADMIN — Medication 2: at 08:28

## 2017-12-14 RX ADMIN — Medication 500 MILLIGRAM(S): at 06:35

## 2017-12-14 RX ADMIN — HEPARIN SODIUM 5000 UNIT(S): 5000 INJECTION INTRAVENOUS; SUBCUTANEOUS at 06:31

## 2017-12-14 RX ADMIN — METFORMIN HYDROCHLORIDE 1000 MILLIGRAM(S): 850 TABLET ORAL at 08:28

## 2017-12-14 RX ADMIN — INSULIN GLARGINE 6 UNIT(S): 100 INJECTION, SOLUTION SUBCUTANEOUS at 09:49

## 2017-12-14 RX ADMIN — Medication 2: at 12:08

## 2017-12-14 NOTE — DISCHARGE NOTE ADULT - PATIENT PORTAL LINK FT
“You can access the [facility] Patient Portal, offered by [facility], by registering with the following website: [email or web address]”

## 2017-12-14 NOTE — DISCHARGE NOTE ADULT - HOSPITAL COURSE
68 y/o woman  with a PMH of HTN, HLD, DM II who presents to the ER c/o weakness, cough and chills.  She was seen at her PMDs office on Dec 5th and treated for viral gastroenteritis, at the time her rapid flu test was negative/indeterminate.  She worsened and reported increased lethargy and not being able to eat for 5 days.  She said her daughter and other family members at home have been having the flu.  An outpatient evaluation showed hypotension and  xray showed ovoid opacities bilaterally concerning for Pna. Pt was sent to the ED.    ED evaluation had CT that showed small patchy airspace consolidation in the right upper lobe, left upper lobe, and lingula indicative of pneumonia. RVP positive for the floor. Medicine was consulted for evaluation and admission. Pt was placed in isolation on a medical floor. She slowly improved. Medically stable for discharge home    *CAP superimposed on Influenza B associated with severe sepsis (leukocytosis, tachycardia, lactic acidosis)  -Source evaluation with blood cultures ngtd, RVP + influenza B, UA abnormal/urine culture with E Coli (though asymptomatic)  -CT chest: Small patchy airspace consolidation RUL, VIVIANA, lingula. Repeat CT 1 month to ensure complete resolution.  -hypotensive/lactate 3 on arrival  -s/p 3L NS with normalization of lactate  -Tamiflu day 3  -CTX + azithromycin day 3, to change to ceftin as outpatient    *Hyperglycemia with known DM type II with renal complication  -A1C 10.5  -on oral hypoglycemics PTA  -lantus added. long discussion with patient. reluctant to add insulin, but agreeable for single shot of lantus  -resume oral medications  -glucometer RX given  *MARISELA on CKD III due to prerenal azotemia/IVVD/sepsis-resolved  -Cr 1.57 on arrival, down to 0.55 with IVF  - hold nephrotoxic agents (lisinpril  and oral DM agents)    *Pseudohyponatremia  -related to elevated glucose  -resolved    *Asymptomatic bacturia  -E Coli  -no symptoms  -on CTX as above in any case    total time 40 minute, including coordination of care    gen-nad  eyes-eomi  cv-no edema  resp-unlabored

## 2017-12-14 NOTE — DISCHARGE NOTE ADULT - ADDITIONAL INSTRUCTIONS
PCP- 1 week. You should bring  your glucose log with you to this appointment to determine if your insulin dose should be adjusted.

## 2017-12-14 NOTE — DISCHARGE NOTE ADULT - MEDICATION SUMMARY - MEDICATIONS TO TAKE
I will START or STAY ON the medications listed below when I get home from the hospital:    lisinopril 10 mg oral tablet  -- 1 tab(s) by mouth once a day  -- Indication: For blood pressure    Glucotrol 5 mg oral tablet  -- 1 tab(s) by mouth once a day  -- Indication: For  diabetes    Glucophage  -- 1000 milligram(s) by mouth once a day  -- Indication: For diabetes    dapagliflozin  -- 10 milligram(s) by mouth once a day  -- Indication: For diabetes    Lantus Solostar Pen 100 units/mL subcutaneous solution  -- 12 unit(s) subcutaneous once a day (at bedtime)  disp qs 1 month  -- Do not drink alcoholic beverages when taking this medication.  It is very important that you take or use this exactly as directed.  Do not skip doses or discontinue unless directed by your doctor.  Keep in refrigerator.  Do not freeze.    -- Indication: For diabetes- the pharmacy can teach you about how to use this pen    Zocor  -- 20 milligram(s) by mouth once a day (at bedtime)  -- Indication: For cholesterol    oseltamivir 75 mg oral capsule  -- 1 cap(s) by mouth 2 times a day  -- Indication: For flu    cefuroxime 500 mg oral tablet  -- 1 tab(s) by mouth every 12 hours   -- Finish all this medication unless otherwise directed by prescriber.  Medication should be taken with plenty of water.  Take with food or milk.    -- Indication: For PNEUMONIA    Combigan 0.2%-0.5% ophthalmic solution  -- 1 drop(s) to each affected eye every 12 hours  -- Indication: For eyes    hydrocodone-homatropine 5 mg-1.5 mg/5 mL oral syrup  -- 5 milliliter(s) by mouth 2 times a day, As Needed -for cough MDD:10  -- Caution federal law prohibits the transfer of this drug to any person other  than the person for whom it was prescribed.  May cause drowsiness.  Alcohol may intensify this effect.  Use care when operating dangerous machinery.  Obtain medical advice before taking any non-prescription drugs as some may affect the action of this medication.    -- Indication: For cough medicine

## 2017-12-14 NOTE — PROGRESS NOTE ADULT - ASSESSMENT
Pt is a  66 y/o woman  with a PMHx of HTN, HLD, DM II admitted 12/11 with c/o weakness, cough and chills.  She was seen at her PMD's office on Dec 5th and treated for viral gastroenteritis, at the time her rapid flu test was negative/indeterminate. She has worsened and reports increased lethargy and not being able to eat for 5 days.  She said her daughter and other family members at home have been having the flu. She denies chest pain, abd pain, fever.  An outpatient xray showed ovoid opacities bilaterally concerning for Pna. RVP is positive in the ER for influenza B, CT chest with multifocal patchy consolidations, pt noted to have wbc ct 11.7, noted to have low bp, was given tamiflu, rocephin/azithromycin.    1. sepsis/influenza B/viral syndrome/CAP  - slowly improving  - on tamiflu increase dose to 75mg BID #3/5  - on droplet precautions  - continue with rocephin 1gm daily #4  - d/c with 2 more days of tamiflu and po ceftin 500mg BID to complete 7 day course  - completed 3 days of azithromycin  - diarrhea resolved- likely viral in etiology  - f/u cbc  - monitor temps  -tolerating abx well so far; no side effects noted  -reason for abx use and side effects reviewed with patient    2. other issues -care per medicine
Pt is a  66 y/o woman  with a PMHx of HTN, HLD, DM II admitted 12/11 with c/o weakness, cough and chills.  She was seen at her PMD's office on Dec 5th and treated for viral gastroenteritis, at the time her rapid flu test was negative/indeterminate. She has worsened and reports increased lethargy and not being able to eat for 5 days.  She said her daughter and other family members at home have been having the flu. She denies chest pain, abd pain, fever.  An outpatient xray showed ovoid opacities bilaterally concerning for Pna. RVP is positive in the ER for influenza B, CT chest with multifocal patchy consolidations, pt noted to have wbc ct 11.7, noted to have low bp, was given tamiflu, rocephin/azithromycin.    1. sepsis/influenza B/viral syndrome/CAP  - slowly improving  - on tamiflu increase dose to 75mg BID #2/5  - on droplet precautions  - continue with rocephin 1gm daily + azithromycin for 500mg daily for superimposed cap coverage #3  - plan for 3 days of azithromycin  - diarrhea resolved likely viral in etiology  - if diarrhea returns, can send GI PCR  - f/u cbc  - monitor temps  -tolerating abx well so far; no side effects noted  -reason for abx use and side effects reviewed with patient    2. other issues -care per medicine

## 2017-12-14 NOTE — PROGRESS NOTE ADULT - SUBJECTIVE AND OBJECTIVE BOX
Patient is a 67y old  Female who presents with a chief complaint of cough (11 Dec 2017 22:54)      HPI:  Pt is a  68 y/o woman  with a PMHx of HTN, HLD, DM II admitted  with c/o weakness, cough and chills.  She was seen at her PMD's office on Dec 5th and treated for viral gastroenteritis, at the time her rapid flu test was negative/indeterminate. She has worsened and reports increased lethargy and not being able to eat for 5 days.  She said her daughter and other family members at home have been having the flu. She denies chest pain, abd pain, fever.  An outpatient xray showed ovoid opacities bilaterally concerning for Pna. RVP is positive in the ER for influenza B, CT chest with multifocal patchy consolidations, pt noted to have wbc ct 11.7, noted to have low bp, was given tamiflu, rocephin/azithromycin.    feels better  less cough  body aches resolving          MEDICATIONS  (STANDING):  azithromycin  IVPB 500 milliGRAM(s) IV Intermittent every 24 hours  cefTRIAXone Injectable 1000 milliGRAM(s) IV Push every 24 hours  heparin  Injectable 5000 Unit(s) SubCutaneous every 12 hours  insulin glargine Injectable (LANTUS) 6 Unit(s) SubCutaneous every morning  insulin lispro (HumaLOG) corrective regimen sliding scale   SubCutaneous three times a day before meals  insulin lispro (HumaLOG) corrective regimen sliding scale   SubCutaneous at bedtime  metFORMIN 1000 milliGRAM(s) Oral two times a day with meals  oseltamivir 75 milliGRAM(s) Oral two times a day  simvastatin 20 milliGRAM(s) Oral at bedtime      Vital Signs Last 24 Hrs  T(C): 37.1 (14 Dec 2017 05:35), Max: 37.3 (13 Dec 2017 23:34)  T(F): 98.7 (14 Dec 2017 05:35), Max: 99.1 (13 Dec 2017 23:34)  HR: 101 (14 Dec 2017 05:35) (101 - 106)  BP: 127/70 (14 Dec 2017 05:35) (121/60 - 127/70)  BP(mean): --  RR: 18 (14 Dec 2017 05:35) (18 - 20)  SpO2: 96% (14 Dec 2017 05:35) (94% - 96%)                  PE:  Constitutional: frail looking  HEENT: NC/AT, EOMI, PERRLA  Neck: supple  Back: no tenderness  Respiratory: decreased breath sounds  Cardiovascular: S1S2 regular, no murmurs  Abdomen: soft, not tender, not distended, positive BS  Genitourinary: deferred  Rectal: deferred  Musculoskeletal: no muscle tenderness, no joint swelling or tenderness  Extremities: no pedal edema  Neurological: no focal deficits  Skin: no rashes    Labs:                           10.2   6.3   )-----------( 223      ( 14 Dec 2017 07:32 )             30.3     12-    136  |  102  |  6<L>  ----------------------------<  230<H>  3.7   |  27  |  0.63    Ca    8.4<L>      14 Dec 2017 07:32               Urinalysis Basic - ( 11 Dec 2017 19:01 )    Color: Yellow / Appearance: Slightly Turbid / S.005 / pH: x  Gluc: x / Ketone: Trace  / Bili: Negative / Urobili: Negative mg/dL   Blood: x / Protein: 15 mg/dL / Nitrite: Negative   Leuk Esterase: Moderate / RBC: 3-5 /HPF / WBC 26-50   Sq Epi: x / Non Sq Epi: Moderate / Bacteria: Moderate      Culture - Urine (17 @ 19:01)    Specimen Source: .Urine None    Culture Results:   >100,000 CFU/ml Escherichia coli    Culture - Blood (17 @ 15:46)    Specimen Source: .Blood None    Culture Results:   No growth to date.      Culture - Blood (17 @ 15:46)    Specimen Source: .Blood None    Culture Results:   No growth to date.        Radiology:  < from: CT Chest w/ IV Cont (17 @ 18:03) >    EXAM:  CT CHEST IC                            PROCEDURE DATE:  2017          INTERPRETATION:  CT CHEST IC    HISTORY:  cough 1 week hypotension  r/o pna                     TECHNIQUE: Contrast enhanced CT of the chest was performed with 90 cc  Omnipaque 350 IV contrast. Coronal and sagittal images were   reconstructed. This study was performed using automatic exposure control   (radiation dose reduction software) to obtain a diagnostic image quality   scan with patient dose as low as reasonably achievable.    COMPARISON: Chest x-ray from the same day    FINDINGS:    LUNGS, AIRWAYS: No central endobronchial lesion or obstruction. Small   patchy airspace consolidation in the anterior right upper lobe, left   upper lobe, and lingula.    PLEURA: No pleural abnormality.    HEART AND VESSELS: Normal heart size. No pericardial effusion. Normal   caliber thoracic aorta. A ductus bump is noted. Main pulmonary arteries   are patent.    MEDIASTINUM AND GURWINDER: No adenopathy.    UPPER ABDOMEN:Diffuse hepatic steatosis.    BONES AND SOFT TISSUES: No acute bony abnormality.    IMPRESSION:     Small patchy airspace consolidation in the right upper lobe, left upper   lobe, and lingula indicative of pneumonia. Follow-up noncontrast chest CT   in one month to ensure complete resolution.    < end of copied text >    Advanced directives addressed: full resuscitation
HOSPITAL COURSE AND ASSESSMENT  66 y/o woman  with a PMH of HTN, HLD, DM II who presents to the ER c/o weakness, cough and chills.  She was seen at her PMDs office on Dec 5th and treated for viral gastroenteritis, at the time her rapid flu test was negative/indeterminate.  She worsened and reported increased lethargy and not being able to eat for 5 days.  She said her daughter and other family members at home have been having the flu.  An outpatient evaluation showed hypotension and  xray showed ovoid opacities bilaterally concerning for Pna. Pt was sent to the ED.    ED evaluation had CT that showed small patchy airspace consolidation in the right upper lobe, left upper lobe, and lingula indicative of pneumonia. RVP positive for the floor. Medicine was consulted for evaluation and admission. Pt was placed in isolation on a medical floor. She slowly improved.    Anticipate discharge in AM to complete abx, antivirals at home.       *CAP superimposed on Influenza B associated with severe sepsis (leukocytosis, tachycardia, lactic acidosis)  -Source evaluation with blood cultures ngtd, RVP + influenza B, UA abnormal/urine culture with E Coli (though asymptomatic)  -CT chest: Small patchy airspace consolidation RUL, VIVIANA, lingula. Repeat CT 1 month to ensure complete resolution.  -hypotensive/lactate 3 on arrival  -s/p 3L NS with normalization of lactate  -Tamiflu day 2  -CTX + azithromycin day 3    *Hyperglycemia with known DM type II with renal complication  -A1C 10.5  -on oral hypoglycemics PTA  -lantus 6 units added this AM  -resume metformin with improved renal function    *MARISELA on CKD III due to prerenal azotemia/IVVD/sepsis-resolved  -Cr 1.57 on arrival, down to 0.55 with IVF  - hold nephrotoxic agents (lisinpril  and oral DM agents)    *Pseudohyponatremia  -related to elevated glucose  -resolved    *Asymptomatic bacturia  -E Coli  -no symptoms  -on CTX as above in any case      ---------------------------------------------------------------------------------------------------  CHIEF COMPLAINT:  flu/cap    SUBJECTIVE:     tolerating PO. OOB. feels ill. still with cough    REVIEW OF SYSTEMS:  All other review of systems is negative unless indicated above    Vital Signs Last 24 Hrs  T(C): 36.8 (13 Dec 2017 10:28), Max: 37.3 (12 Dec 2017 22:45)  T(F): 98.2 (13 Dec 2017 10:28), Max: 99.2 (12 Dec 2017 22:45)  HR: 104 (13 Dec 2017 10:28) (99 - 104)  BP: 150/72 (13 Dec 2017 10:28) (98/67 - 150/72)  BP(mean): --  RR: 18 (13 Dec 2017 10:28) (17 - 18)  SpO2: 97% (13 Dec 2017 10:28) (94% - 99%)    CAPILLARY BLOOD GLUCOSE      POCT Blood Glucose.: 331 mg/dL (13 Dec 2017 12:00)  POCT Blood Glucose.: 230 mg/dL (13 Dec 2017 07:56)  POCT Blood Glucose.: 278 mg/dL (12 Dec 2017 21:39)  POCT Blood Glucose.: 343 mg/dL (12 Dec 2017 17:06)      PHYSICAL EXAM:  Constitutional: NAD, NCAT  HEENT: EOMI, Normal Hearing, MMM, fair dentition  Neck: trachea midline, No JVD  Respiratory: Breath sounds are clear, unlabored respiration  Cardiovascular: S1 and S2, regular rate   Gastrointestinal: Bowel Sounds present, soft, nontender, nondistended  Extremities: No peripheral edema  Vascular: 2+ radial pulse  Neurological: awake, alert, follows commands, sensation grossly intact  Psych: appropriate affect, grooming, insight  Musculoskeletal: moves all extremities  Skin: No rashes    ALLERGIES  No Known Allergies      MEDICATIONS  (STANDING):  azithromycin  IVPB 500 milliGRAM(s) IV Intermittent every 24 hours  cefTRIAXone Injectable 1000 milliGRAM(s) IV Push every 24 hours  heparin  Injectable 5000 Unit(s) SubCutaneous every 12 hours  insulin glargine Injectable (LANTUS) 6 Unit(s) SubCutaneous every morning  insulin lispro (HumaLOG) corrective regimen sliding scale   SubCutaneous three times a day before meals  insulin lispro (HumaLOG) corrective regimen sliding scale   SubCutaneous at bedtime  oseltamivir 75 milliGRAM(s) Oral two times a day  simvastatin 20 milliGRAM(s) Oral at bedtime      LABS: All Labs Reviewed:                        10.0   5.9   )-----------( 187      ( 13 Dec 2017 07:11 )             29.4     12-13    138  |  106  |  6<L>  ----------------------------<  226<H>  3.3<L>   |  25  |  0.55    Ca    7.9<L>      13 Dec 2017 07:11            Blood Culture: 12-11 @ 19:01  Organism --  Gram Stain Blood -- Gram Stain --  Specimen Source .Urine None  Culture-Blood --    12-11 @ 15:46  Organism --  Gram Stain Blood -- Gram Stain --  Specimen Source .Blood None  Culture-Blood --
HOSPITAL COURSE AND ASSESSMENT  68 y/o woman  with a PMH of HTN, HLD, DM II who presents to the ER c/o weakness, cough and chills.  She was seen at her PMDs office on Dec 5th and treated for viral gastroenteritis, at the time her rapid flu test was negative/indeterminate.  She worsened and reported increased lethargy and not being able to eat for 5 days.  She said her daughter and other family members at home have been having the flu.  An outpatient evaluation showed hypotension and  xray showed ovoid opacities bilaterally concerning for Pna. Pt was sent to the ED.    ED evaluation had CT that showed small patchy airspace consolidation in the right upper lobe, left upper lobe, and lingula indicative of pneumonia. RVP positive for the floor. Medicine was consulted for evaluation and admission. Pt was placed in isolation on a medical floor.       *CAP superimposed on Influenza B associated with severe sepsis (leukocytosis, tachycardia, lactic acidosis)  -Source evaluation with blood cultures ngtd, RVP + influenza B, UA abnormal/urine culture with E Coli (though asymptomatic)  -CT chest: Small patchy airspace consolidation RUL, VIVIANA, lingula. Repeat CT 1 month to ensure complete resolution.  -hypotensive/lactate 3 on arrival  -s/p 3L NS with normalization of lactate  -Tamiflu day 2  -CTX + azithromycin day 2    *Hyperglycemia with known DM type II with renal complication  -A1C ordered  -on oral hypoglycemics PTA  -SSI here, but suspect pt will need additional coverage added in AM if glucose >200 consistently    *MARISELA on CKD III due to prerenal azotemia/IVVD/sepsis-resolved  -Cr 1.57 on arrival, down to 1 with IVF  - hold nephrotoxic agents (lisinpril  and oral DM agents)      ---------------------------------------------------------------------------------------------------  CHIEF COMPLAINT:  flu/cap    SUBJECTIVE:   feels ill. wants to rest. no change in symptoms really since admission  tolerating PO. OOB.    REVIEW OF SYSTEMS:  All other review of systems is negative unless indicated above    Vital Signs Last 24 Hrs  T(C): 37.2 (12 Dec 2017 17:37), Max: 37.7 (12 Dec 2017 10:43)  T(F): 99 (12 Dec 2017 17:37), Max: 99.8 (12 Dec 2017 10:43)  HR: 104 (12 Dec 2017 17:37) (96 - 104)  BP: 116/62 (12 Dec 2017 17:37) (98/51 - 116/62)  BP(mean): --  RR: 17 (12 Dec 2017 17:37) (16 - 20)  SpO2: 99% (12 Dec 2017 17:37) (96% - 99%)    CAPILLARY BLOOD GLUCOSE      POCT Blood Glucose.: 278 mg/dL (12 Dec 2017 21:39)  POCT Blood Glucose.: 343 mg/dL (12 Dec 2017 17:06)  POCT Blood Glucose.: 346 mg/dL (12 Dec 2017 12:03)  POCT Blood Glucose.: 256 mg/dL (12 Dec 2017 09:11)      PHYSICAL EXAM:  Constitutional: NAD, NCAT, ill appearing  HEENT: EOMI, Normal Hearing, MMM, fair dentition  Neck: trachea midline, No JVD  Respiratory: Breath sounds are clear, unlabored respiration  Cardiovascular: S1 and S2, regular rate   Gastrointestinal: Bowel Sounds present, soft, nontender, nondistended  Extremities: No peripheral edema  Vascular: 2+ radial pulse  Neurological: awake, alert, follows commands, sensation grossly intact  Psych: appropriate affect, grooming, insight  Musculoskeletal: moves all extremities  Skin: No rashes    ALLERGIES  No Known Allergies      MEDICATIONS  (STANDING):  azithromycin  IVPB 500 milliGRAM(s) IV Intermittent every 24 hours  cefTRIAXone Injectable 1000 milliGRAM(s) IV Push every 24 hours  heparin  Injectable 5000 Unit(s) SubCutaneous every 12 hours  insulin lispro (HumaLOG) corrective regimen sliding scale   SubCutaneous three times a day before meals  insulin lispro (HumaLOG) corrective regimen sliding scale   SubCutaneous at bedtime  oseltamivir 75 milliGRAM(s) Oral two times a day  simvastatin 20 milliGRAM(s) Oral at bedtime  sodium chloride 0.9%. 500 milliLiter(s) (65 mL/Hr) IV Continuous <Continuous>      LABS: All Labs Reviewed:                        10.5   8.0   )-----------( 154      ( 12 Dec 2017 05:10 )             31.0     12-12    135  |  105  |  11  ----------------------------<  334<H>  3.7   |  24  |  0.84    Ca    7.6<L>      12 Dec 2017 05:10    TPro  7.7  /  Alb  3.2<L>  /  TBili  0.5  /  DBili  x   /  AST  18  /  ALT  27  /  AlkPhos  94  12-11    PT/INR - ( 11 Dec 2017 15:46 )   PT: 12.6 sec;   INR: 1.16 ratio         PTT - ( 11 Dec 2017 15:46 )  PTT:26.5 sec      Blood Culture: 12-11 @ 19:01  Organism --  Gram Stain Blood -- Gram Stain --  Specimen Source .Urine None  Culture-Blood --    12-11 @ 15:46  Organism --  Gram Stain Blood -- Gram Stain --  Specimen Source .Blood None  Culture-Blood --
Patient is a 67y old  Female who presents with a chief complaint of cough (11 Dec 2017 22:54)      HPI:  Pt is a  68 y/o woman  with a PMHx of HTN, HLD, DM II admitted  with c/o weakness, cough and chills.  She was seen at her PMD's office on Dec 5th and treated for viral gastroenteritis, at the time her rapid flu test was negative/indeterminate. She has worsened and reports increased lethargy and not being able to eat for 5 days.  She said her daughter and other family members at home have been having the flu. She denies chest pain, abd pain, fever.  An outpatient xray showed ovoid opacities bilaterally concerning for Pna. RVP is positive in the ER for influenza B, CT chest with multifocal patchy consolidations, pt noted to have wbc ct 11.7, noted to have low bp, was given tamiflu, rocephin/azithromycin.    still w/ cough/achiness  feels slightly better        MEDICATIONS  (STANDING):  azithromycin  IVPB 500 milliGRAM(s) IV Intermittent every 24 hours  cefTRIAXone Injectable 1000 milliGRAM(s) IV Push every 24 hours  heparin  Injectable 5000 Unit(s) SubCutaneous every 12 hours  insulin glargine Injectable (LANTUS) 6 Unit(s) SubCutaneous every morning  insulin lispro (HumaLOG) corrective regimen sliding scale   SubCutaneous three times a day before meals  insulin lispro (HumaLOG) corrective regimen sliding scale   SubCutaneous at bedtime  oseltamivir 75 milliGRAM(s) Oral two times a day  potassium chloride    Tablet ER 40 milliEquivalent(s) Oral once  simvastatin 20 milliGRAM(s) Oral at bedtime      Vital Signs Last 24 Hrs  T(C): 36.8 (13 Dec 2017 10:28), Max: 37.3 (12 Dec 2017 22:45)  T(F): 98.2 (13 Dec 2017 10:28), Max: 99.2 (12 Dec 2017 22:45)  HR: 104 (13 Dec 2017 10:28) (99 - 104)  BP: 150/72 (13 Dec 2017 10:28) (98/67 - 150/72)  BP(mean): --  RR: 18 (13 Dec 2017 10:28) (17 - 18)  SpO2: 97% (13 Dec 2017 10:28) (94% - 99%)            PE:  Constitutional: frail looking  HEENT: NC/AT, EOMI, PERRLA  Neck: supple  Back: no tenderness  Respiratory: decreased breath sounds  Cardiovascular: S1S2 regular, no murmurs  Abdomen: soft, not tender, not distended, positive BS  Genitourinary: deferred  Rectal: deferred  Musculoskeletal: no muscle tenderness, no joint swelling or tenderness  Extremities: no pedal edema  Neurological: no focal deficits  Skin: no rashes    Labs:                        10.0   5.9   )-----------( 187      ( 13 Dec 2017 07:11 )             29.4         138  |  106  |  6<L>  ----------------------------<  226<H>  3.3<L>   |  25  |  0.55    Ca    7.9<L>      13 Dec 2017 07:11    TPro  7.7  /  Alb  3.2<L>  /  TBili  0.5  /  DBili  x   /  AST  18  /  ALT  27  /  AlkPhos  94  12-11        Urinalysis Basic - ( 11 Dec 2017 19:01 )    Color: Yellow / Appearance: Slightly Turbid / S.005 / pH: x  Gluc: x / Ketone: Trace  / Bili: Negative / Urobili: Negative mg/dL   Blood: x / Protein: 15 mg/dL / Nitrite: Negative   Leuk Esterase: Moderate / RBC: 3-5 /HPF / WBC 26-50   Sq Epi: x / Non Sq Epi: Moderate / Bacteria: Moderate      Culture - Urine (17 @ 19:01)    Specimen Source: .Urine None    Culture Results:   >100,000 CFU/ml Escherichia coli    Culture - Blood (17 @ 15:46)    Specimen Source: .Blood None    Culture Results:   No growth to date.      Culture - Blood (17 @ 15:46)    Specimen Source: .Blood None    Culture Results:   No growth to date.        Radiology:  < from: CT Chest w/ IV Cont (17 @ 18:03) >    EXAM:  CT CHEST IC                            PROCEDURE DATE:  2017          INTERPRETATION:  CT CHEST IC    HISTORY:  cough 1 week hypotension  r/o pna                     TECHNIQUE: Contrast enhanced CT of the chest was performed with 90 cc  Omnipaque 350 IV contrast. Coronal and sagittal images were   reconstructed. This study was performed using automatic exposure control   (radiation dose reduction software) to obtain a diagnostic image quality   scan with patient dose as low as reasonably achievable.    COMPARISON: Chest x-ray from the same day    FINDINGS:    LUNGS, AIRWAYS: No central endobronchial lesion or obstruction. Small   patchy airspace consolidation in the anterior right upper lobe, left   upper lobe, and lingula.    PLEURA: No pleural abnormality.    HEART AND VESSELS: Normal heart size. No pericardial effusion. Normal   caliber thoracic aorta. A ductus bump is noted. Main pulmonary arteries   are patent.    MEDIASTINUM AND GURWINDER: No adenopathy.    UPPER ABDOMEN:Diffuse hepatic steatosis.    BONES AND SOFT TISSUES: No acute bony abnormality.    IMPRESSION:     Small patchy airspace consolidation in the right upper lobe, left upper   lobe, and lingula indicative of pneumonia. Follow-up noncontrast chest CT   in one month to ensure complete resolution.    < end of copied text >    Advanced directives addressed: full resuscitation

## 2017-12-14 NOTE — DISCHARGE NOTE ADULT - CARE PLAN
Principal Discharge DX:	Pneumonia due to infectious organism, unspecified laterality, unspecified part of lung  Goal:	to be free of infection  Instructions for follow-up, activity and diet:	take all medications as directed.   practice good hand washing

## 2017-12-16 LAB
CULTURE RESULTS: SIGNIFICANT CHANGE UP
CULTURE RESULTS: SIGNIFICANT CHANGE UP
SPECIMEN SOURCE: SIGNIFICANT CHANGE UP
SPECIMEN SOURCE: SIGNIFICANT CHANGE UP

## 2017-12-19 DIAGNOSIS — E87.1 HYPO-OSMOLALITY AND HYPONATREMIA: ICD-10-CM

## 2017-12-19 DIAGNOSIS — N17.9 ACUTE KIDNEY FAILURE, UNSPECIFIED: ICD-10-CM

## 2017-12-19 DIAGNOSIS — N28.9 DISORDER OF KIDNEY AND URETER, UNSPECIFIED: ICD-10-CM

## 2017-12-19 DIAGNOSIS — E11.22 TYPE 2 DIABETES MELLITUS WITH DIABETIC CHRONIC KIDNEY DISEASE: ICD-10-CM

## 2017-12-19 DIAGNOSIS — E78.5 HYPERLIPIDEMIA, UNSPECIFIED: ICD-10-CM

## 2017-12-19 DIAGNOSIS — N18.3 CHRONIC KIDNEY DISEASE, STAGE 3 (MODERATE): ICD-10-CM

## 2017-12-19 DIAGNOSIS — R65.21 SEVERE SEPSIS WITH SEPTIC SHOCK: ICD-10-CM

## 2017-12-19 DIAGNOSIS — J10.00 INFLUENZA DUE TO OTHER IDENTIFIED INFLUENZA VIRUS WITH UNSPECIFIED TYPE OF PNEUMONIA: ICD-10-CM

## 2017-12-19 DIAGNOSIS — I12.9 HYPERTENSIVE CHRONIC KIDNEY DISEASE WITH STAGE 1 THROUGH STAGE 4 CHRONIC KIDNEY DISEASE, OR UNSPECIFIED CHRONIC KIDNEY DISEASE: ICD-10-CM

## 2017-12-19 DIAGNOSIS — A08.4 VIRAL INTESTINAL INFECTION, UNSPECIFIED: ICD-10-CM

## 2017-12-19 DIAGNOSIS — E86.0 DEHYDRATION: ICD-10-CM

## 2017-12-19 DIAGNOSIS — I95.9 HYPOTENSION, UNSPECIFIED: ICD-10-CM

## 2017-12-19 DIAGNOSIS — B34.9 VIRAL INFECTION, UNSPECIFIED: ICD-10-CM

## 2017-12-19 DIAGNOSIS — E11.9 TYPE 2 DIABETES MELLITUS WITHOUT COMPLICATIONS: ICD-10-CM

## 2017-12-19 DIAGNOSIS — A41.9 SEPSIS, UNSPECIFIED ORGANISM: ICD-10-CM

## 2017-12-19 DIAGNOSIS — E11.65 TYPE 2 DIABETES MELLITUS WITH HYPERGLYCEMIA: ICD-10-CM

## 2017-12-19 DIAGNOSIS — E11.29 TYPE 2 DIABETES MELLITUS WITH OTHER DIABETIC KIDNEY COMPLICATION: ICD-10-CM

## 2017-12-19 DIAGNOSIS — E78.4 OTHER HYPERLIPIDEMIA: ICD-10-CM

## 2017-12-19 DIAGNOSIS — R39.2 EXTRARENAL UREMIA: ICD-10-CM

## 2024-08-16 NOTE — PATIENT PROFILE ADULT. - ANESTHESIA, PREVIOUS REACTION, PROFILE
Endoscopy Scheduling Screen    Final Scheduling Details     Procedure scheduled  Colonoscopy / Upper endoscopy (EGD)    Surgeon:  Xi     Date of procedure:  08/22/2024     Pre-OP / PAC:   No - Not required for this site.    Location  [facility] - Per exclusion criteria.    Sedation   MAC/Deep Sedation - Per exclusion criteria.      Patient Reminders:   You will receive a call from a Nurse to review instructions and health history.  This assessment must be completed prior to your procedure.  Failure to complete the Nurse assessment may result in the procedure being cancelled.      On the day of your procedure, please designate an adult(s) who can drive you home stay with you for the next 24 hours. The medicines used in the exam will make you sleepy. You will not be able to drive.      You cannot take public transportation, ride share services, or non-medical taxi service without a responsible caregiver.  Medical transport services are allowed with the requirement that a responsible caregiver will receive you at your destination.  We require that drivers and caregivers are confirmed prior to your procedure.   none